# Patient Record
(demographics unavailable — no encounter records)

---

## 2024-10-07 NOTE — PLAN
[FreeTextEntry1] : Continue Insulin.  monitor glucose. Check labs.  fall precautions. Low chol. diabetic diet.  protein supplement, avoid nephrotoxins, stay well hydrated.  f/u neurology re: dementia.  f/u ophthalmology.   Losartan in am for HTN, monitor BP.  meds refilled.

## 2024-10-07 NOTE — HEALTH RISK ASSESSMENT
[No] : In the past 12 months have you used drugs other than those required for medical reasons? No [No falls in past year] : Patient reported no falls in the past year [0] : 2) Feeling down, depressed, or hopeless: Not at all (0) [PHQ-2 Negative - No further assessment needed] : PHQ-2 Negative - No further assessment needed [With Patient/Caregiver] : , with patient/caregiver [Never] : Never [Designated Healthcare Proxy] : Designated healthcare proxy [Name: ___] : Health Care Proxy's Name: [unfilled]  [Relationship: ___] : Relationship: [unfilled] [de-identified] : sedentary, walker when outside house [de-identified] : regular [[id number]] : 0 [AdvancecareDate] : 10/24

## 2024-10-07 NOTE — HISTORY OF PRESENT ILLNESS
[FreeTextEntry1] : see HPI [FreeTextEntry8] : Here to address Diabetes Mellitus. Her weight has improved and she is alert today and awake.  she needs 24/7 supervision due to h/o falls. Her gait is impaired, and she has poor balance. She is AAOX2..  She has chronic leg pain and needs pain medicine.  Her mood is good, and she needs refill. No chest pain, SOB, fever, chills, cough. Here with granddaughter Riana and aide. last eye exam was 6 months ago -[facility] in [place]. She has haring aides. She has retinopathy. Macular degeneration.   She has dentures which have been fitted. She has unsteady gait with history of multiple falls due to lower extremity weakness, fatigue. No recent falls. She needs to continue use of wheelchair/walker in setting of gait imbalance during long distance walk, dementia. She takes Losartan at night for HTN. She is off Quetiapine due to hang over effect. She is taking Belsomra which is helping. Her stool is solid and is darker. Blood sugar fasting  and 120. She gets Metformin once a day. She is not taking statin. She has 2 aides for 80 hrs per week, her son is one of them. She walks with supervision and minimal assistance. [facility] to senior center [facility] a [facility] on Friday.

## 2024-10-07 NOTE — PHYSICAL EXAM
[Well Nourished] : well nourished [Well Developed] : well developed [Ill-Appearing] : ill-appearing [Normal Sclera/Conjunctiva] : normal sclera/conjunctiva [PERRL] : pupils equal round and reactive to light [EOMI] : extraocular movements intact [Normal Outer Ear/Nose] : the outer ears and nose were normal in appearance [Normal Oropharynx] : the oropharynx was normal [No JVD] : no jugular venous distention [No Lymphadenopathy] : no lymphadenopathy [Supple] : supple [Thyroid Normal, No Nodules] : the thyroid was normal and there were no nodules present [No Respiratory Distress] : no respiratory distress  [No Accessory Muscle Use] : no accessory muscle use [Clear to Auscultation] : lungs were clear to auscultation bilaterally [Normal Rate] : normal rate  [Regular Rhythm] : with a regular rhythm [Normal S1, S2] : normal S1 and S2 [No Murmur] : no murmur heard [No Varicosities] : no varicosities [No Edema] : there was no peripheral edema [No Extremity Clubbing/Cyanosis] : no extremity clubbing/cyanosis [Soft] : abdomen soft [Non Tender] : non-tender [Non-distended] : non-distended [No Masses] : no abdominal mass palpated [No HSM] : no HSM [Normal Bowel Sounds] : normal bowel sounds [No CVA Tenderness] : no CVA  tenderness [No Spinal Tenderness] : no spinal tenderness [Coordination Grossly Intact] : coordination grossly intact [No Focal Deficits] : no focal deficits [Deep Tendon Reflexes (DTR)] : deep tendon reflexes were 2+ and symmetric [de-identified] : unsteady walking [de-identified] : memory loss

## 2024-10-07 NOTE — REVIEW OF SYSTEMS
[Recent Change In Weight] : ~T recent weight change [Joint Pain] : joint pain [Memory Loss] : memory loss [Unsteady Walking] : ataxia [Negative] : Heme/Lymph [Pain] : no pain [Itching] : no itching [Headache] : no headache [Dizziness] : no dizziness

## 2024-12-06 NOTE — REVIEW OF SYSTEMS
[Confused or Disoriented] : confusion [Memory Lapses or Loss] : memory loss [Difficulty with Language] : ~M difficulty with language [Difficulty Writing] : difficulty writing [Difficulties in Speech] : speech difficulties [Difficulty Walking] : difficulty walking [Frequent Falls] : frequent falls [Depression] : depression [As Noted in HPI] : as noted in HPI [Loss Of Hearing] : hearing loss [Negative] : Heme/Lymph [Decr. Concentrating Ability] : no decrease in concentrating ability [Changed Thought Patterns] : no change in thought patterns [Repeating Questions] : no repeated questioning about recent events [Facial Weakness] : no facial weakness [Arm Weakness] : no arm weakness [Hand Weakness] : no hand weakness [Leg Weakness] : no leg weakness [Poor Coordination] : good coordination [Numbness] : no numbness [Tingling] : no tingling [Abnormal Sensation] : no abnormal sensation [Hypersensitivity] : no hypersensitivity [Seizures] : no convulsions [Dizziness] : no dizziness [Fainting] : no fainting [Lightheadedness] : no lightheadedness [Vertigo] : no vertigo [Cluster Headache] : no cluster headache [Migraine Headache] : no migraine headache [Tension Headache] : no tension-type headache [Inability to Walk] : able to walk [Ataxia] : no ataxia [Limping] : not limping [de-identified] : 1 person assist with walking w/cand walker for long distances  [de-identified] : isabel  [FreeTextEntry4] : recently new hearing aids

## 2024-12-06 NOTE — ASSESSMENT
[FreeTextEntry1] : Assessment: 88-year-old female with pmh depression, htn, diabetes and Insomnia. Needs assistance with adls and iadls. Unable to do MMSE due to severe cognitive impairment. She did not answer my questions. Exam very limited. Physical exam wdl. Hearing loss bilaterally with new hearing aids.          Diagnostic Impression:     -Depression -Advanced Dementia  -Insomnia -Bilateral Hearing loss   Plan: -Increased Belsomra to 10 mg  -Educated on lifestyle modifications such as daily exercise, healthy balanced diet and good sleep habits  -Continue going to senior center- goes 1x weekly

## 2024-12-06 NOTE — PHYSICAL EXAM
[General Appearance - Alert] : alert [Person] : oriented to person [Cranial Nerves Optic (II)] : visual acuity intact bilaterally,  visual fields full to confrontation, pupils equal round and reactive to light [Cranial Nerves Oculomotor (III)] : extraocular motion intact [Cranial Nerves Trigeminal (V)] : facial sensation intact symmetrically [Cranial Nerves Facial (VII)] : face symmetrical [Cranial Nerves Vestibulocochlear (VIII)] : hearing was intact bilaterally [Cranial Nerves Glossopharyngeal (IX)] : tongue and palate midline [Cranial Nerves Accessory (XI - Cranial And Spinal)] : head turning and shoulder shrug symmetric [Cranial Nerves Hypoglossal (XII)] : there was no tongue deviation with protrusion [Motor Tone] : muscle tone was normal in all four extremities [Motor Strength] : muscle strength was normal in all four extremities [Abnormal Walk] : normal gait [2+] : Triceps left 2+ [1+] : Ankle jerk left 1+ [Sclera] : the sclera and conjunctiva were normal [PERRL With Normal Accommodation] : pupils were equal in size, round, reactive to light, with normal accommodation [Extraocular Movements] : extraocular movements were intact [Full Visual Field] : full visual field [FreeTextEntry1] : Exam limited -She did not answer questions to MMSE  -unable to draw pictures -did not engaged in conversation with me  [Place] : disoriented to place [Time] : disoriented to time [Short Term Intact] : short term memory impaired [Remote Intact] : remote memory impaired [Visual Intact] : visual attention was ~T ~L decreased [Naming Objects] : difficulty naming common objects [Repeating Phrases] : difficulty repeating a phrase [Writing A Sentence] : difficulty writing a sentence [Comprehension] : comprehension not intact [Reading] : difficulty reading [Current Events] : inadequate knowledge of current events [Past History] : inadequate knowledge of personal past history [Vocabulary] : inadequate range of vocabulary [Motor Strength Upper Extremities Bilaterally] : there was weakness in both upper extremities [Motor Strength Lower Extremities Bilaterally] : strength was normal in both lower extremities [Romberg's Sign] : a positive Romberg's sign was present [Limited Balance] : balance was intact [Tremor] : no tremor present [FreeTextEntry4] : answers to her name  [FreeTextEntry6] : mild paratonia  [FreeTextEntry8] : can walk a few steps alone but once more than a few steps she wobbles to the side and loses balance. one person assit

## 2024-12-06 NOTE — HISTORY OF PRESENT ILLNESS
[FreeTextEntry1] : COVID  and , not hospitalized. took remdesivir  COVID VACCINE FULL.  HPI interval 2024: 88-year-old female presents today for follow up visit with her granddaughter and hha. She has a bed alarm for safety at home. Mood is good. Appetite has improved. Sleep is poor. She went to ER for broken nose  but was not admitted. Behavior is good, no agitation. She uses cane or walker at time if not holds on to someone- one-person assist. She is active during day. She interacts with children of her aid- she does math homework with them.   HPI: 87-year-old female presents today for initial cognitive evaluation with granddaughter and hha. She was diagnosed in  with Dementia. HHA 80 hours x 7 days. She lives with her son and granddaughter. memory loss for over 8 years. She is not aggressive. She is restless. She used to be on quetiapine, but it made her like a "zombie". She barely verbalizes much, says one-word answers. She rarely engages in conversation. No hallucinations or delusions. She started smacking her lips around covid but all medications make her tired. She is hard of hearing and just started wearing hearing aids bilaterally and is more stimulated. 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 She is getting apathetic. She gets up on her own. She confuses family members. She is not repeating herself many times. She fell out of bed and was hospitalized 2024.    PMH:  HLD, D.M, back pain/sciatica  -Memory: STM loss  -Speech: poor   -Orientation: confused -Praxis: ok  -Decision making/Executive fx/Multitasking: poor  -Sleep: taken off Seroquel but now sleep is not as good. She looks for her son    -Appetite: ok (weight has gone up and down. 20 lb loss)   -Motor symptoms: Fell- went to ED    -B/B: none    -Psychiatric symptoms: Art- ups and downs     -Functional status:   Ly Index of [place] in Activities of Daily Livin. Bathing/Showerin  2. Dressin  3. Toiletin   4. Transferrin 5. Continence:  0 6: Feedin  TOTAL: 0      Analy-Ricky Instrumental Activities of Daily Living:  A. Ability to Use Telephone: 0   B. Shoppin C. Food Preparation: 0    D. Housekeepin   E. Laundry:  0 F. Transportation: 0    G. Responsibility for Own Medications: 0  H: Ability to Handle Finances:  0 TOTAL:  0   CDR: 3  -Professional status: housewife   PCP and other physicians:  -PCP: Dr. Shelley Ramesh   -Neuro: Dr. Irwin Segura Workup done: Done in [facility]

## 2024-12-06 NOTE — REVIEW OF SYSTEMS
[Confused or Disoriented] : confusion [Memory Lapses or Loss] : memory loss [Difficulty with Language] : ~M difficulty with language [Difficulty Writing] : difficulty writing [Difficulties in Speech] : speech difficulties [Difficulty Walking] : difficulty walking [Frequent Falls] : frequent falls [Depression] : depression [As Noted in HPI] : as noted in HPI [Loss Of Hearing] : hearing loss [Negative] : Heme/Lymph [Decr. Concentrating Ability] : no decrease in concentrating ability [Changed Thought Patterns] : no change in thought patterns [Repeating Questions] : no repeated questioning about recent events [Facial Weakness] : no facial weakness [Arm Weakness] : no arm weakness [Hand Weakness] : no hand weakness [Leg Weakness] : no leg weakness [Poor Coordination] : good coordination [Numbness] : no numbness [Tingling] : no tingling [Abnormal Sensation] : no abnormal sensation [Hypersensitivity] : no hypersensitivity [Seizures] : no convulsions [Dizziness] : no dizziness [Fainting] : no fainting [Lightheadedness] : no lightheadedness [Vertigo] : no vertigo [Cluster Headache] : no cluster headache [Migraine Headache] : no migraine headache [Tension Headache] : no tension-type headache [Inability to Walk] : able to walk [Ataxia] : no ataxia [Limping] : not limping [de-identified] : 1 person assist with walking w/cand walker for long distances  [de-identified] : isabel  [FreeTextEntry4] : recently new hearing aids

## 2024-12-06 NOTE — HISTORY OF PRESENT ILLNESS
[FreeTextEntry1] : COVID  and , not hospitalized. took remdesivir  COVID VACCINE FULL.  HPI interval 2024: 88-year-old female presents today for follow up visit with her granddaughter and hha. She has a bed alarm for safety at home. Mood is good. Appetite has improved. Sleep is poor. She went to ER for broken nose  but was not admitted. Behavior is good, no agitation. She uses cane or walker at time if not holds on to someone- one-person assist. She is active during day. She interacts with children of her aid- she does math homework with them.   HPI: 87-year-old female presents today for initial cognitive evaluation with granddaughter and hha. She was diagnosed in  with Dementia. HHA 80 hours x 7 days. She lives with her son and granddaughter. memory loss for over 8 years. She is not aggressive. She is restless. She used to be on quetiapine, but it made her like a "zombie". She barely verbalizes much, says one-word answers. She rarely engages in conversation. No hallucinations or delusions. She started smacking her lips around covid but all medications make her tired. She is hard of hearing and just started wearing hearing aids bilaterally and is more stimulated. 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 She is getting apathetic. She gets up on her own. She confuses family members. She is not repeating herself many times. She fell out of bed and was hospitalized 2024.    PMH:  HLD, D.M, back pain/sciatica  -Memory: STM loss  -Speech: poor   -Orientation: confused -Praxis: ok  -Decision making/Executive fx/Multitasking: poor  -Sleep: taken off Seroquel but now sleep is not as good. She looks for her son    -Appetite: ok (weight has gone up and down. 20 lb loss)   -Motor symptoms: Fell- went to ED    -B/B: none    -Psychiatric symptoms: Art- ups and downs     -Functional status:   Ly Index of [place] in Activities of Daily Livin. Bathing/Showerin  2. Dressin  3. Toiletin   4. Transferrin 5. Continence:  0 6: Feedin  TOTAL: 0      Analy-Ricky Instrumental Activities of Daily Living:  A. Ability to Use Telephone: 0   B. Shoppin C. Food Preparation: 0    D. Housekeepin   E. Laundry:  0 F. Transportation: 0    G. Responsibility for Own Medications: 0  H: Ability to Handle Finances:  0 TOTAL:  0   CDR: 3  -Professional status: housewife   PCP and other physicians:  -PCP: Dr. Shelley Ramesh   -Neuro: Dr. Irwin Segura Workup done: Done in [facility]

## 2025-02-14 NOTE — HEALTH RISK ASSESSMENT
[No] : In the past 12 months have you used drugs other than those required for medical reasons? No [No falls in past year] : Patient reported no falls in the past year [0] : 2) Feeling down, depressed, or hopeless: Not at all (0) [PHQ-2 Negative - No further assessment needed] : PHQ-2 Negative - No further assessment needed [With Patient/Caregiver] : , with patient/caregiver [Designated Healthcare Proxy] : Designated healthcare proxy [Name: ___] : Health Care Proxy's Name: [unfilled]  [Relationship: ___] : Relationship: [unfilled] [Never] : Never [de-identified] : sedentary, walker when outside house [de-identified] : regular [[id number]] : 0 [AdvancecareDate] : 02/25

## 2025-02-14 NOTE — PHYSICAL EXAM
[Well Nourished] : well nourished [Well Developed] : well developed [Ill-Appearing] : ill-appearing [Normal Sclera/Conjunctiva] : normal sclera/conjunctiva [PERRL] : pupils equal round and reactive to light [EOMI] : extraocular movements intact [Normal Outer Ear/Nose] : the outer ears and nose were normal in appearance [Normal Oropharynx] : the oropharynx was normal [No JVD] : no jugular venous distention [No Lymphadenopathy] : no lymphadenopathy [Supple] : supple [Thyroid Normal, No Nodules] : the thyroid was normal and there were no nodules present [No Respiratory Distress] : no respiratory distress  [No Accessory Muscle Use] : no accessory muscle use [Clear to Auscultation] : lungs were clear to auscultation bilaterally [Normal Rate] : normal rate  [Regular Rhythm] : with a regular rhythm [Normal S1, S2] : normal S1 and S2 [No Murmur] : no murmur heard [No Varicosities] : no varicosities [No Edema] : there was no peripheral edema [No Extremity Clubbing/Cyanosis] : no extremity clubbing/cyanosis [Soft] : abdomen soft [Non Tender] : non-tender [Non-distended] : non-distended [No Masses] : no abdominal mass palpated [No HSM] : no HSM [Normal Bowel Sounds] : normal bowel sounds [No CVA Tenderness] : no CVA  tenderness [No Spinal Tenderness] : no spinal tenderness [Coordination Grossly Intact] : coordination grossly intact [No Focal Deficits] : no focal deficits [Deep Tendon Reflexes (DTR)] : deep tendon reflexes were 2+ and symmetric [de-identified] : unsteady walking [de-identified] : memory loss

## 2025-02-14 NOTE — PLAN
[FreeTextEntry1] : Continue Insulin.  monitor glucose. Check labs.  fall precautions. Low chol. diabetic diet.  protein supplement, avoid nephrotoxins, stay well hydrated.  f/u neurology re: dementia.  f/u ophthalmology.   Losartan in am for HTN, monitor BP.  meds refilled.   letter for medical necessity for HHA 24/7.

## 2025-02-14 NOTE — HISTORY OF PRESENT ILLNESS
[FreeTextEntry1] : see HPI [FreeTextEntry8] : Here to address Diabetes Mellitus. Her weight has improved, and she is resting today on exam table.   She needs 24/7 supervision due to h/o falls, last fall in 10/2024 resulting in nasal fracture. Her gait is impaired, and she has poor balance. She is AAOX2..  She has chronic leg pain and needs pain medicine.  Her mood is good, and she needs refill. No chest pain, SOB, fever, chills, cough. Here with granddaughter Riana and sixto. last eye exam was 6 months ago -[facility] in [place]. She has haring aides. She has retinopathy. Macular degeneration, last exam less than 6 months ago.   She has dentures which have been fitted. She has unsteady gait with history of multiple falls due to lower extremity weakness, fatigue. No recent falls. She needs to continue use of wheelchair/walker in setting of gait imbalance during long distance walk, dementia. She takes Losartan at night for HTN. She is taking Belsomra which is helping her sleep. blood sugar fasting  and 120. She gets Metformin once a day. She is not taking statin.  She walks with supervision and minimal assistance. She goes to senior center and needs forms completed.   She is here with [facility] Luna. She needs 1 person assist for walking. She needs forms completed for  day care. She has insomnia and is seeing neurologist . She had nasal fracture in 10/24.

## 2025-03-19 NOTE — HEALTH RISK ASSESSMENT
[No] : In the past 12 months have you used drugs other than those required for medical reasons? No [No falls in past year] : Patient reported no falls in the past year [0] : 2) Feeling down, depressed, or hopeless: Not at all (0) [PHQ-2 Negative - No further assessment needed] : PHQ-2 Negative - No further assessment needed [Never] : Never [With Patient/Caregiver] : , with patient/caregiver [Designated Healthcare Proxy] : Designated healthcare proxy [Relationship: ___] : Relationship: [unfilled] [Name: ___] : Health Care Proxy's Name: [unfilled]  [de-identified] : sedentary, walker when outside house [de-identified] : regular [[id number]] : 0 [AdvancecareDate] : 03/25

## 2025-03-19 NOTE — HISTORY OF PRESENT ILLNESS
[FreeTextEntry8] : Here to address Diabetes Mellitus. Her weight is stable and she is somnolent today. here with grand daughter. .  she needs 24/7 supervision due to h/o falls. last fall was 1 week and 1 day ago at home at night when she started walking to bathroom and lost balance and hit her head on faucet. She went to ER for repair of forehead laceration in layers. She is here today for suture removal. She is AAOX2.. last eye exam was 6 months ago -[facility]patrick in [place]. She has haring aides. She has retinopathy. Macular degeneration.   She has dentures which have been fitted. She has unsteady gait with history of multiple falls due to lower extremity weakness, fatigue.. She needs to continue use of wheelchair/walker in setting of gait imbalance during long distance walk, dementia. She takes Losartan at night for HTN. She is off Quetiapine due to hang over effect. She is taking Belsomra which is helping. Her stool is normal since her oral iron was stopped. She will receive Iron infusion tomorrow.  Blood sugar fasting  in morning  and its in 200's in afternoon and then it drops in night time after Metformin.  Overnight she has hypoglycemia and endocrine is considering adjusting or discontinuing insulin. She gets Metformin once a day. She is not taking statin. She has 2 aides for 80 hrs per week, her son is one of them. She walks with supervision and minimal assistance. She goers to senior center once a week on Friday.   [FreeTextEntry1] : see HPI

## 2025-03-19 NOTE — PHYSICAL EXAM
[Well Nourished] : well nourished [Well Developed] : well developed [Ill-Appearing] : ill-appearing [Normal Sclera/Conjunctiva] : normal sclera/conjunctiva [PERRL] : pupils equal round and reactive to light [EOMI] : extraocular movements intact [Normal Outer Ear/Nose] : the outer ears and nose were normal in appearance [Normal Oropharynx] : the oropharynx was normal [No JVD] : no jugular venous distention [No Lymphadenopathy] : no lymphadenopathy [Supple] : supple [Thyroid Normal, No Nodules] : the thyroid was normal and there were no nodules present [No Respiratory Distress] : no respiratory distress  [No Accessory Muscle Use] : no accessory muscle use [Clear to Auscultation] : lungs were clear to auscultation bilaterally [Normal Rate] : normal rate  [Regular Rhythm] : with a regular rhythm [Normal S1, S2] : normal S1 and S2 [No Murmur] : no murmur heard [No Varicosities] : no varicosities [No Edema] : there was no peripheral edema [No Extremity Clubbing/Cyanosis] : no extremity clubbing/cyanosis [Soft] : abdomen soft [Non Tender] : non-tender [Non-distended] : non-distended [No Masses] : no abdominal mass palpated [No HSM] : no HSM [Normal Bowel Sounds] : normal bowel sounds [No CVA Tenderness] : no CVA  tenderness [No Spinal Tenderness] : no spinal tenderness [Coordination Grossly Intact] : coordination grossly intact [No Focal Deficits] : no focal deficits [Deep Tendon Reflexes (DTR)] : deep tendon reflexes were 2+ and symmetric [de-identified] : unsteady walking [de-identified] : memory loss

## 2025-04-07 NOTE — PHYSICAL EXAM
[General Appearance - Alert] : alert [Person] : oriented to person [Cranial Nerves Optic (II)] : visual acuity intact bilaterally,  visual fields full to confrontation, pupils equal round and reactive to light [Cranial Nerves Oculomotor (III)] : extraocular motion intact [Cranial Nerves Trigeminal (V)] : facial sensation intact symmetrically [Cranial Nerves Facial (VII)] : face symmetrical [Cranial Nerves Vestibulocochlear (VIII)] : hearing was intact bilaterally [Cranial Nerves Glossopharyngeal (IX)] : tongue and palate midline [Cranial Nerves Accessory (XI - Cranial And Spinal)] : head turning and shoulder shrug symmetric [Cranial Nerves Hypoglossal (XII)] : there was no tongue deviation with protrusion [Motor Tone] : muscle tone was normal in all four extremities [Motor Strength] : muscle strength was normal in all four extremities [Motor Strength Upper Extremities Bilaterally] : there was weakness in both upper extremities [Romberg's Sign] : a positive Romberg's sign was present [2+] : Triceps left 2+ [1+] : Ankle jerk left 1+ [Sclera] : the sclera and conjunctiva were normal [PERRL With Normal Accommodation] : pupils were equal in size, round, reactive to light, with normal accommodation [Extraocular Movements] : extraocular movements were intact [Full Visual Field] : full visual field [FreeTextEntry1] : Exam limited -She did not answer questions to MMSE  -unable to draw pictures -did not engaged in conversation with me  [Place] : disoriented to place [Time] : disoriented to time [Short Term Intact] : short term memory impaired [Remote Intact] : remote memory impaired [Visual Intact] : visual attention was ~T ~L decreased [Naming Objects] : difficulty naming common objects [Repeating Phrases] : difficulty repeating a phrase [Writing A Sentence] : difficulty writing a sentence [Comprehension] : comprehension not intact [Reading] : difficulty reading [Current Events] : inadequate knowledge of current events [Past History] : inadequate knowledge of personal past history [Vocabulary] : inadequate range of vocabulary [Motor Strength Lower Extremities Bilaterally] : strength was normal in both lower extremities [Limited Balance] : balance was intact [Tremor] : no tremor present [FreeTextEntry4] : answers to her name  [FreeTextEntry6] : mild paratonia  [FreeTextEntry8] : can walk a few steps alone but once more than a few steps she wobbles to the side and loses balance. one person assit

## 2025-04-07 NOTE — ASSESSMENT
[FreeTextEntry1] : Assessment: 88-year-old female with pmh depression, htn, diabetes and Insomnia. Needs assistance with adls and iadls. Unable to do MMSE due to severe cognitive impairment. She did not answer my questions. Exam very limited. Physical exam wdl. Hearing loss bilaterally with new hearing aids.          Diagnostic Impression:     -Depression -Advanced Dementia  -Insomnia -Bilateral Hearing loss   Plan: -Continue Belsomra to 10 mg  -Educated on lifestyle modifications such as daily exercise, healthy balanced diet and good sleep habits  -Continue going to senior center- goes 1x weekly on Fridays  -PT for multiple falls

## 2025-04-07 NOTE — REVIEW OF SYSTEMS
[Confused or Disoriented] : confusion [Memory Lapses or Loss] : memory loss [Difficulty with Language] : ~M difficulty with language [Difficulty Writing] : difficulty writing [Difficulties in Speech] : speech difficulties [Difficulty Walking] : difficulty walking [Frequent Falls] : frequent falls [Depression] : depression [As Noted in HPI] : as noted in HPI [Loss Of Hearing] : hearing loss [Negative] : Heme/Lymph [Decr. Concentrating Ability] : no decrease in concentrating ability [Changed Thought Patterns] : no change in thought patterns [Repeating Questions] : no repeated questioning about recent events [Facial Weakness] : no facial weakness [Arm Weakness] : no arm weakness [Hand Weakness] : no hand weakness [Leg Weakness] : no leg weakness [Poor Coordination] : good coordination [Numbness] : no numbness [Tingling] : no tingling [Abnormal Sensation] : no abnormal sensation [Hypersensitivity] : no hypersensitivity [Seizures] : no convulsions [Dizziness] : no dizziness [Fainting] : no fainting [Lightheadedness] : no lightheadedness [Vertigo] : no vertigo [Cluster Headache] : no cluster headache [Migraine Headache] : no migraine headache [Tension Headache] : no tension-type headache [Inability to Walk] : able to walk [Ataxia] : no ataxia [Limping] : not limping [de-identified] : 1 person assist with walking w/cand walker for long distances  [de-identified] : isabel  [FreeTextEntry4] : recently new hearing aids

## 2025-04-07 NOTE — HISTORY OF PRESENT ILLNESS
[FreeTextEntry1] : COVID  and , not hospitalized. took remdesivir  COVID VACCINE FULL.  HPI interval 2025: 88 year old female presents today for follow up visit with her granddaugther and daryl. No changes in behavior or memory. DARYL M- 12 hours and 10 hours Sat and  87 hours. She had two big falls-  and broke her nose. March a fall- 15 stitches and had CT. 2 ER visits but no hospital overnight stay. Denies hallucinations or delusions. Apetite is stable. Sleep is good but sleepy during day. Belsomra 10 mg has helped with overnight sleep. Dependent in all adls and iadls.   HPI interval 2024: 88-year-old female presents today for follow up visit with her granddaughter and hha. She has a bed alarm for safety at home. Mood is good. Appetite has improved. Sleep is poor. She went to ER for broken nose  but was not admitted. Behavior is good, no agitation. She uses cane or walker at time if not holds on to someone- one-person assist. She is active during day. She interacts with children of her aid- she does math homework with them.   HPI: 87-year-old female presents today for initial cognitive evaluation with granddaughter and hhnella. She was diagnosed in  with Dementia. HHA 80 hours x 7 days. She lives with her son and granddaughter. memory loss for over 8 years. She is not aggressive. She is restless. She used to be on quetiapine, but it made her like a "zombie". She barely verbalizes much, says one-word answers. She rarely engages in conversation. No hallucinations or delusions. She started smacking her lips around covid but all medications make her tired. She is hard of hearing and just started wearing hearing aids bilaterally and is more stimulated. 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 She is getting apathetic. She gets up on her own. She confuses family members. She is not repeating herself many times. She fell out of bed and was hospitalized 2024.    PMH:  HLD, D.M, back pain/sciatica  -Memory: STM loss  -Speech: poor   -Orientation: confused -Praxis: ok  -Decision making/Executive fx/Multitasking: poor  -Sleep: taken off Seroquel but now sleep is not as good. She looks for her son    -Appetite: ok (weight has gone up and down. 20 lb loss)   -Motor symptoms: Fell- went to ED    -B/B: none    -Psychiatric symptoms: Art- ups and downs     -Functional status:   Ly Index of [place] in Activities of Daily Livin. Bathing/Showerin  2. Dressin  3. Toiletin   4. Transferrin 5. Continence:  0 6: Feedin  TOTAL: 0      [place] Instrumental Activities of Daily Living:  A. Ability to Use Telephone: 0   B. Shoppin C. Food Preparation: 0    D. Housekeepin   E. Laundry:  0 F. Transportation: 0    G. Responsibility for Own Medications: 0  H: Ability to Handle Finances:  0 TOTAL:  0   CDR: 3  -Professional status: housewife   PCP and other physicians:  -PCP: Dr. Shelley Ramesh   -Neuro: Dr. Irwin Segura Workup done: Done in [facility]

## 2025-06-04 NOTE — HEALTH RISK ASSESSMENT
[No] : In the past 12 months have you used drugs other than those required for medical reasons? No [No falls in past year] : Patient reported no falls in the past year [0] : 2) Feeling down, depressed, or hopeless: Not at all (0) [PHQ-2 Negative - No further assessment needed] : PHQ-2 Negative - No further assessment needed [With Patient/Caregiver] : , with patient/caregiver [Designated Healthcare Proxy] : Designated healthcare proxy [Name: ___] : Health Care Proxy's Name: [unfilled]  [Relationship: ___] : Relationship: [unfilled] [Never] : Never [de-identified] : sedentary, walker when outside house [de-identified] : regular [[id number]] : 0 [AdvancecareDate] : 05/25

## 2025-06-04 NOTE — PLAN
[FreeTextEntry1] :   monitor glucose. Check labs.  fall precautions. Low chol. diabetic diet.  protein supplement, avoid nephrotoxins, stay well hydrated.  f/u neurology re: dementia.  f/u ophthalmology.   Losartan in am for HTN, monitor BP.  meds refilled.   metformin once a day. pt. is following endocrine re: Insulin.  letter for senior center typed.  pt. can resume activities, 10 sutures removed from forehead laceration.

## 2025-06-04 NOTE — ASSESSMENT
[FreeTextEntry1] : labs ordered. MOLST form to be completed by family.   Diabetic diet.  monitor weight.  f/u hematology and endocrine.  Glucerna. glucose monitoring.

## 2025-06-04 NOTE — HISTORY OF PRESENT ILLNESS
[FreeTextEntry1] : see HPI [FreeTextEntry8] : Here to address Diabetes Mellitus. here with granddaughter. .  She needs 24/7 supervision due to h/o falls. last fall was 1 week and 1 day ago at home at night when she started walking to bathroom and lost balance and hit her head on faucet. She went to ER for repair of forehead laceration in layers. She is here today for suture removal. She is AAOX2.. last eye exam was 6 months ago -[facility]chell Fitzpatrick in [place]. She has haring aides. She has retinopathy. Macular degeneration.   She has dentures which have been fitted. She has unsteady gait with history of multiple falls due to lower extremity weakness, fatigue. She needs to continue use of wheelchair/walker in setting of gait imbalance during long distance walk, dementia. She takes Losartan at night for HTN.   Blood sugar fasting 100-160 in morning and it's 120-200 in evening. She is not taking statin. She has 2 aides for 80 hrs per week, her son is one of them. She walks with supervision and minimal assistance. She goers to senior center once a week on Friday.  She saw endocrine and her insulin was discontinued, and Metformin was increased to BID. She lost 10 pounds in last few months. She has loose dentures on top. She will see hematologist today for anemia. She received Iron infusion once on 03/2025.last eye exam was in 04/2025 for Macular degeneration and is using AREDS vitamin.

## 2025-06-04 NOTE — PHYSICAL EXAM
[Well Nourished] : well nourished [Well Developed] : well developed [Ill-Appearing] : ill-appearing [Normal Sclera/Conjunctiva] : normal sclera/conjunctiva [PERRL] : pupils equal round and reactive to light [EOMI] : extraocular movements intact [Normal Outer Ear/Nose] : the outer ears and nose were normal in appearance [Normal Oropharynx] : the oropharynx was normal [No JVD] : no jugular venous distention [No Lymphadenopathy] : no lymphadenopathy [Supple] : supple [Thyroid Normal, No Nodules] : the thyroid was normal and there were no nodules present [No Respiratory Distress] : no respiratory distress  [No Accessory Muscle Use] : no accessory muscle use [Clear to Auscultation] : lungs were clear to auscultation bilaterally [Normal Rate] : normal rate  [Regular Rhythm] : with a regular rhythm [Normal S1, S2] : normal S1 and S2 [No Murmur] : no murmur heard [No Varicosities] : no varicosities [No Edema] : there was no peripheral edema [No Extremity Clubbing/Cyanosis] : no extremity clubbing/cyanosis [Soft] : abdomen soft [Non Tender] : non-tender [Non-distended] : non-distended [No Masses] : no abdominal mass palpated [No HSM] : no HSM [Normal Bowel Sounds] : normal bowel sounds [No CVA Tenderness] : no CVA  tenderness [No Spinal Tenderness] : no spinal tenderness [Coordination Grossly Intact] : coordination grossly intact [No Focal Deficits] : no focal deficits [Deep Tendon Reflexes (DTR)] : deep tendon reflexes were 2+ and symmetric [de-identified] : unsteady walking [de-identified] : memory loss